# Patient Record
Sex: FEMALE | Race: WHITE
[De-identification: names, ages, dates, MRNs, and addresses within clinical notes are randomized per-mention and may not be internally consistent; named-entity substitution may affect disease eponyms.]

---

## 2020-09-06 ENCOUNTER — HOSPITAL ENCOUNTER (EMERGENCY)
Dept: HOSPITAL 53 - M ED | Age: 25
Discharge: HOME | End: 2020-09-06
Payer: SELF-PAY

## 2020-09-06 VITALS — WEIGHT: 109.57 LBS | HEIGHT: 62 IN | BODY MASS INDEX: 20.16 KG/M2

## 2020-09-06 VITALS — DIASTOLIC BLOOD PRESSURE: 68 MMHG | SYSTOLIC BLOOD PRESSURE: 122 MMHG

## 2020-09-06 DIAGNOSIS — O99.321: Primary | ICD-10-CM

## 2020-09-06 DIAGNOSIS — Z3A.08: ICD-10-CM

## 2020-09-06 DIAGNOSIS — O99.331: ICD-10-CM

## 2020-09-06 DIAGNOSIS — B19.20: ICD-10-CM

## 2020-09-06 DIAGNOSIS — F17.210: ICD-10-CM

## 2020-09-06 DIAGNOSIS — O98.411: ICD-10-CM

## 2020-09-06 DIAGNOSIS — F11.23: ICD-10-CM

## 2020-09-06 DIAGNOSIS — O23.41: ICD-10-CM

## 2020-09-06 LAB
ALBUMIN SERPL BCG-MCNC: 3.6 GM/DL (ref 3.2–5.2)
ALT SERPL W P-5'-P-CCNC: 32 U/L (ref 12–78)
B-HCG SERPL-ACNC: (no result) MIU/ML
BASOPHILS # BLD AUTO: 0 10^3/UL (ref 0–0.2)
BASOPHILS NFR BLD AUTO: 0.2 % (ref 0–1)
BILIRUB CONJ SERPL-MCNC: < 0.1 MG/DL (ref 0–0.2)
BILIRUB SERPL-MCNC: 0.3 MG/DL (ref 0.2–1)
CK MB CFR.DF SERPL CALC: 1.04
CK MB SERPL-MCNC: < 1 NG/ML (ref ?–3.6)
CK SERPL-CCNC: 96 U/L (ref 26–192)
EOSINOPHIL # BLD AUTO: 0 10^3/UL (ref 0–0.5)
EOSINOPHIL NFR BLD AUTO: 0.3 % (ref 0–3)
HCT VFR BLD AUTO: 42.3 % (ref 36–47)
HGB BLD-MCNC: 14 G/DL (ref 12–15.5)
INR PPP: 0.87
LIPASE SERPL-CCNC: 183 U/L (ref 73–393)
LYMPHOCYTES # BLD AUTO: 2.3 10^3/UL (ref 1.5–5)
LYMPHOCYTES NFR BLD AUTO: 17.8 % (ref 24–44)
MCH RBC QN AUTO: 27.8 PG (ref 27–33)
MCHC RBC AUTO-ENTMCNC: 33.1 G/DL (ref 32–36.5)
MCV RBC AUTO: 84.1 FL (ref 80–96)
MONOCYTES # BLD AUTO: 1.2 10^3/UL (ref 0–0.8)
MONOCYTES NFR BLD AUTO: 9.2 % (ref 0–5)
NEUTROPHILS # BLD AUTO: 9.2 10^3/UL (ref 1.5–8.5)
NEUTROPHILS NFR BLD AUTO: 71.8 % (ref 36–66)
PLATELET # BLD AUTO: 268 10^3/UL (ref 150–450)
PROT SERPL-MCNC: 8.4 GM/DL (ref 6.4–8.2)
PROTHROMBIN TIME: 12 SECONDS (ref 11.8–14)
RBC # BLD AUTO: 5.03 10^6/UL (ref 4–5.4)
TROPONIN I SERPL-MCNC: < 0.02 NG/ML (ref ?–0.1)
WBC # BLD AUTO: 12.8 10^3/UL (ref 4–10)

## 2020-09-06 PROCEDURE — 82550 ASSAY OF CK (CPK): CPT

## 2020-09-06 PROCEDURE — 71045 X-RAY EXAM CHEST 1 VIEW: CPT

## 2020-09-06 PROCEDURE — 96361 HYDRATE IV INFUSION ADD-ON: CPT

## 2020-09-06 PROCEDURE — 85025 COMPLETE CBC W/AUTO DIFF WBC: CPT

## 2020-09-06 PROCEDURE — 80047 BASIC METABLC PNL IONIZED CA: CPT

## 2020-09-06 PROCEDURE — 96375 TX/PRO/DX INJ NEW DRUG ADDON: CPT

## 2020-09-06 PROCEDURE — 93005 ELECTROCARDIOGRAM TRACING: CPT

## 2020-09-06 PROCEDURE — 96374 THER/PROPH/DIAG INJ IV PUSH: CPT

## 2020-09-06 PROCEDURE — 76801 OB US < 14 WKS SINGLE FETUS: CPT

## 2020-09-06 PROCEDURE — 82553 CREATINE MB FRACTION: CPT

## 2020-09-06 PROCEDURE — 93041 RHYTHM ECG TRACING: CPT

## 2020-09-06 PROCEDURE — 87186 SC STD MICRODIL/AGAR DIL: CPT

## 2020-09-06 PROCEDURE — 84702 CHORIONIC GONADOTROPIN TEST: CPT

## 2020-09-06 PROCEDURE — 99284 EMERGENCY DEPT VISIT MOD MDM: CPT

## 2020-09-06 PROCEDURE — 85610 PROTHROMBIN TIME: CPT

## 2020-09-06 PROCEDURE — 83690 ASSAY OF LIPASE: CPT

## 2020-09-06 PROCEDURE — 81001 URINALYSIS AUTO W/SCOPE: CPT

## 2020-09-06 PROCEDURE — 87088 URINE BACTERIA CULTURE: CPT

## 2020-09-06 PROCEDURE — 94760 N-INVAS EAR/PLS OXIMETRY 1: CPT

## 2020-09-06 PROCEDURE — 80076 HEPATIC FUNCTION PANEL: CPT

## 2020-09-06 NOTE — REPVR
PROCEDURE INFORMATION: 

Exam: XR Chest, 1 View 

Exam date and time: 9/6/2020 5:41 PM 

Age: 25 years old 

Clinical indication: Other: Withdrawl; Additional info: Chest pain 



TECHNIQUE: 

Imaging protocol: XR of the chest 

Views: 1 view. 



COMPARISON: 

No relevant prior studies available. 



FINDINGS: 

Lungs: Unremarkable. No consolidation. 

Pleural space: Unremarkable. No pleural effusion. No pneumothorax. 

Heart/Mediastinum: Unremarkable. No cardiomegaly. 

Bones/joints: No acute bone or joint abnormality. 



IMPRESSION: 

No acute findings. 



Electronically signed by: Addy Diop On 09/06/2020  18:36:24 PM

## 2020-09-06 NOTE — REPVR
PROCEDURE INFORMATION: 

Exam: US Pregnancy First Trimester, Transabdominal 

Exam date and time: 9/6/2020 6:20 PM 

Age: 25 years old 

Clinical indication: Pain and condition or disease; Lmp or gestational age 

(weeks): Unknown lmp; Other: Withdrawl; Pregnancy complicated by abdominal or 

pelvic pain; Lower; First trimester; Other: PT can't remember; Pregnant; 

Additional info: 7 weeks pregnant; Pelvic pain 



TECHNIQUE: 

Imaging protocol: Real-time transabdominal obstetrical ultrasound of the 

maternal pelvis and a first trimester pregnancy, less than 14 weeks 0 days, 

with image documentation. 



COMPARISON: 

No relevant prior studies available. 



FINDINGS: 

Gestation: Fetal pole present. 

Embryonic/fetal heart rate: Fetal cardiac activity present. Fetal heart rate 

176 bpm. 



BIOMETRY: 

Gestational age (AUA): Estimated gestational age by crown-rump length is 8 

weeks, 0 days. 

Crown-Rump length: Crown-rump length 15.72 mm. 



MATERNAL: 

Uterus: Unremarkable. 

Cervix: Unremarkable. 

Right adnexa: Unremarkable. 

Left adnexa: Unremarkable. 

Intraperitoneal space: No intraperitoneal free fluid. 



IMPRESSION: 

Single live intrauterine fetus with an estimated gestational age of 8 weeks, 0 

days and a heart rate of 176 bpm. 



Electronically signed by: Addy Diop On 09/06/2020  18:39:13 PM

## 2020-09-13 NOTE — ECGEPIP
[facility] - ED

                                       

                                       Test Date:    2020

Pat Name:     JOEL MOSELEY           Department:   

Patient ID:   [id number]                 Room:         -

Gender:       Female                   Technician:   [email or web address]

:          1995               Requested By: Maja Lundborg-Gray 

Order Number: [id number]     Reading MD:   Maja Lundborg-Gray

                                 Measurements

Intervals                              Axis          

Rate:         77                       P:            81

HI:           115                      QRS:          66

QRSD:         83                       T:            42

QT:           366                                    

QTc:          415                                    

                           Interpretive Statements

SINUS RHYTHM WITH SHORT HI INTERVAL

BORDERLINE ECG

NONSPECIFIC ST T CHANGES

NO PRIOR TO COMPARE

SEE SCANNED DOWNTIME REPORT